# Patient Record
Sex: FEMALE | Race: WHITE | NOT HISPANIC OR LATINO | URBAN - METROPOLITAN AREA
[De-identification: names, ages, dates, MRNs, and addresses within clinical notes are randomized per-mention and may not be internally consistent; named-entity substitution may affect disease eponyms.]

---

## 2017-04-07 ENCOUNTER — APPOINTMENT (RX ONLY)
Dept: URBAN - METROPOLITAN AREA CLINIC 70 | Facility: CLINIC | Age: 52
Setting detail: DERMATOLOGY
End: 2017-04-07

## 2017-04-07 ENCOUNTER — APPOINTMENT (RX ONLY)
Dept: URBAN - METROPOLITAN AREA CLINIC 71 | Facility: CLINIC | Age: 52
Setting detail: DERMATOLOGY
End: 2017-04-07

## 2017-04-07 DIAGNOSIS — Z41.9 ENCOUNTER FOR PROCEDURE FOR PURPOSES OTHER THAN REMEDYING HEALTH STATE, UNSPECIFIED: ICD-10-CM

## 2017-04-07 PROBLEM — E78.5 HYPERLIPIDEMIA, UNSPECIFIED: Status: ACTIVE | Noted: 2017-04-07

## 2017-04-07 PROCEDURE — ? BOTOX (U OR CC)

## 2017-04-07 PROCEDURE — ? FILLERS (CC)

## 2017-04-07 ASSESSMENT — LOCATION SIMPLE DESCRIPTION DERM
LOCATION SIMPLE: RIGHT LIP
LOCATION SIMPLE: LEFT EYEBROW
LOCATION SIMPLE: RIGHT EYEBROW
LOCATION SIMPLE: RIGHT LIP
LOCATION SIMPLE: GLABELLA
LOCATION SIMPLE: CHIN
LOCATION SIMPLE: LEFT TEMPLE
LOCATION SIMPLE: LEFT LIP
LOCATION SIMPLE: SUPERIOR FOREHEAD
LOCATION SIMPLE: RIGHT CHEEK
LOCATION SIMPLE: LEFT CHEEK
LOCATION SIMPLE: RIGHT TEMPLE
LOCATION SIMPLE: LEFT CHEEK
LOCATION SIMPLE: SUPERIOR FOREHEAD
LOCATION SIMPLE: LEFT LIP
LOCATION SIMPLE: RIGHT EYEBROW
LOCATION SIMPLE: RIGHT TEMPLE
LOCATION SIMPLE: LEFT EYEBROW
LOCATION SIMPLE: LEFT TEMPLE
LOCATION SIMPLE: RIGHT CHEEK
LOCATION SIMPLE: CHIN
LOCATION SIMPLE: GLABELLA

## 2017-04-07 ASSESSMENT — LOCATION DETAILED DESCRIPTION DERM
LOCATION DETAILED: LEFT CENTRAL MALAR CHEEK
LOCATION DETAILED: LEFT INFERIOR TEMPLE
LOCATION DETAILED: RIGHT SUPERIOR CENTRAL MALAR CHEEK
LOCATION DETAILED: LEFT LOWER CUTANEOUS LIP
LOCATION DETAILED: LEFT LATERAL EYEBROW
LOCATION DETAILED: RIGHT CHIN
LOCATION DETAILED: LEFT CENTRAL MALAR CHEEK
LOCATION DETAILED: RIGHT INFERIOR TEMPLE
LOCATION DETAILED: RIGHT LATERAL EYEBROW
LOCATION DETAILED: RIGHT LATERAL MALAR CHEEK
LOCATION DETAILED: GLABELLA
LOCATION DETAILED: RIGHT LATERAL MALAR CHEEK
LOCATION DETAILED: RIGHT CHIN
LOCATION DETAILED: SUPERIOR MID FOREHEAD
LOCATION DETAILED: LEFT CENTRAL BUCCAL CHEEK
LOCATION DETAILED: RIGHT SUPERIOR CENTRAL MALAR CHEEK
LOCATION DETAILED: SUPERIOR MID FOREHEAD
LOCATION DETAILED: LEFT LATERAL EYEBROW
LOCATION DETAILED: GLABELLA
LOCATION DETAILED: LEFT CENTRAL BUCCAL CHEEK
LOCATION DETAILED: RIGHT CENTRAL BUCCAL CHEEK
LOCATION DETAILED: RIGHT INFERIOR TEMPLE
LOCATION DETAILED: RIGHT LATERAL EYEBROW
LOCATION DETAILED: LEFT INFERIOR TEMPLE
LOCATION DETAILED: LEFT LOWER CUTANEOUS LIP
LOCATION DETAILED: RIGHT UPPER CUTANEOUS LIP
LOCATION DETAILED: RIGHT UPPER CUTANEOUS LIP
LOCATION DETAILED: RIGHT CENTRAL BUCCAL CHEEK

## 2017-04-07 ASSESSMENT — LOCATION ZONE DERM
LOCATION ZONE: FACE
LOCATION ZONE: LIP
LOCATION ZONE: FACE
LOCATION ZONE: LIP

## 2017-04-07 NOTE — PROCEDURE: FILLERS (CC)
Additional Area 1 Volume In Cc: 1
Expiration Date (Month Year): 05/07/2018
Vermilion Lips Filler Volume In Cc: 0
Anesthesia Type: 0.3% lidocaine (mixed within filler)
Quantity Per Injection Site (Cc): 0.05 cc
Filler: Voluma
Lot #: [id number]
Additional Area 1 Location: Cheeks
Detail Level: Detailed
Additional Anesthesia Volume In Cc: 6
Consent: Written consent obtained. Risks include but not limited to bruising, beading, irregular texture, ulceration, infection, allergic reaction, scar formation, incomplete augmentation, temporary nature, procedural pain.
Post-Care Instructions: Patient instructed to apply ice to reduce swelling.
Anesthesia Volume In Cc: 0.5

## 2017-04-07 NOTE — PROCEDURE: FILLERS (CC)
Additional Anesthesia Volume In Cc: 6
Nasolabial Folds Filler Volume In Cc: 0
Anesthesia Volume In Cc: 0.5
Anesthesia Type: 0.3% lidocaine (mixed within filler)
Detail Level: Detailed
Consent: Written consent obtained. Risks include but not limited to bruising, beading, irregular texture, ulceration, infection, allergic reaction, scar formation, incomplete augmentation, temporary nature, procedural pain.
Filler: Voluma
Additional Area 1 Location: Cheeks
Post-Care Instructions: Patient instructed to apply ice to reduce swelling.
Additional Area 1 Volume In Cc: 1
Lot #: [id number]
Expiration Date (Month Year): 05/07/2018
Quantity Per Injection Site (Cc): 0.05 cc

## 2017-04-07 NOTE — PROCEDURE: BOTOX (U OR CC)
Additional Area 6 Units: 0
Quantity Per Injection Site (Units Or Cc): 12 U
Quantity Per Injection Site (Units Or Cc): 4 U
Detail Level: Detailed
Quantity Per Injection Site (Units Or Cc): 1 U
Quantity Per Injection Site (Units Or Cc): 11 U
Quantity Per Injection Site (Units Or Cc): 2 U
Forehead Units/Cc: 12
Document As Units Or Cc?: units
Additional Area 4 Units: 4
Additional Area 3 Units: 8
Consent: Written consent obtained. Risks include but not limited to lid/brow ptosis, bruising, swelling, diplopia, temporary effect, incomplete chemical denervation.
Quantity Per Injection Site (Units Or Cc): 20 U
Additional Area 1 Location: [facility]
Dilution (U/0.1 Cc): 1
Lot #: [id number]
Quantity Per Injection Site (Units Or Cc): 5 U
Glabellar Complex Units: 21
Expiration Date (Month Year): 10/2018
Additional Area 2 Location: Chin
Periorbital Skin Units/Cc: 24
Additional Area 4 Location: Tail End
Additional Area 2 Units: 5
Post-Care Instructions: Patient instructed to not lie down for 4 hours and limit physical activity for 24 hours.
Additional Area 3 Location: Upper and lower mouth

## 2017-04-07 NOTE — PROCEDURE: BOTOX (U OR CC)
Additional Area 6 Units: 0
Glabellar Complex Units: 21
Additional Area 3 Units: 8
Additional Area 3 Location: Upper and lower mouth
Consent: Written consent obtained. Risks include but not limited to lid/brow ptosis, bruising, swelling, diplopia, temporary effect, incomplete chemical denervation.
Lot #: [id number]
Periorbital Skin Units/Cc: 24
Document As Units Or Cc?: units
Expiration Date (Month Year): 10/2018
Additional Area 4 Location: Tail End
Additional Area 1 Location: [facility]
Additional Area 4 Units: 4
Additional Area 2 Units: 5
Detail Level: Detailed
Dilution (U/0.1 Cc): 1
Additional Area 2 Location: Chin
Forehead Units/Cc: 12
Post-Care Instructions: Patient instructed to not lie down for 4 hours and limit physical activity for 24 hours.
Quantity Per Injection Site (Units Or Cc): 20 U
Quantity Per Injection Site (Units Or Cc): 1 U
Quantity Per Injection Site (Units Or Cc): 12 U
Quantity Per Injection Site (Units Or Cc): 11 U
Quantity Per Injection Site (Units Or Cc): 4 U
Quantity Per Injection Site (Units Or Cc): 5 U
Quantity Per Injection Site (Units Or Cc): 2 U

## 2017-05-09 ENCOUNTER — APPOINTMENT (RX ONLY)
Dept: URBAN - METROPOLITAN AREA CLINIC 71 | Facility: CLINIC | Age: 52
Setting detail: DERMATOLOGY
End: 2017-05-09

## 2017-05-09 ENCOUNTER — APPOINTMENT (RX ONLY)
Dept: URBAN - METROPOLITAN AREA CLINIC 70 | Facility: CLINIC | Age: 52
Setting detail: DERMATOLOGY
End: 2017-05-09

## 2019-08-06 ENCOUNTER — HOSPITAL ENCOUNTER (OUTPATIENT)
Dept: HOSPITAL 61 - PCVCIMAG | Age: 54
Discharge: HOME | End: 2019-08-06
Attending: INTERNAL MEDICINE
Payer: COMMERCIAL

## 2019-08-06 DIAGNOSIS — R61: ICD-10-CM

## 2019-08-06 DIAGNOSIS — R00.2: Primary | ICD-10-CM

## 2019-08-06 PROCEDURE — 78452 HT MUSCLE IMAGE SPECT MULT: CPT

## 2019-08-06 PROCEDURE — A9500 TC99M SESTAMIBI: HCPCS

## 2019-08-06 PROCEDURE — 93017 CV STRESS TEST TRACING ONLY: CPT

## 2019-08-06 PROCEDURE — 93306 TTE W/DOPPLER COMPLETE: CPT

## 2019-08-06 NOTE — PCVCIMAG
--------------- APPROVED REPORT --------------





Imaging Protocol: Rest Tc-99m/Stress Tc-99m 1 day

Study performed:  08/06/2019 10:46:06



Indication: Palpitations, Genralized Diaphoresis

Patient Location: Out-Patient

Stress Nurse: Vivian Patel RN, Jenni Collins RN

NM Tech:Marlo Perez NMB



Ht: 5 ft 4 in Wt: 153 lbs BSA:  1.75 m2

HR: 82 bpm                      BP: 152/87 mmHg         BMI:  

26.2

Rhythm:  Sinus Rhythm



Medical History

Medical History: Age, Hyperlipidemia

Medications: Simvastatin

Allergies: No known drug allergies

Exercise History: Physically active



Resting Data

Rest SPECT myocardial perfusion imaging was performed in supine 

position 45 minutes following the intravenous injection of 10.7 mCi 

of Tc-99m Sestamibi.

Time of rest injection: 1020     Date: 08/06/2019

Administration Route: IV

Administration Site: Left AC



Exercise Stress

At peak stress, the patient was injected intravenously with 33.6mCi 

of Tc-99m Sestamibi.

Time of stress injection: 1130     Date: 08/06/2019

Administration Route: IV

Administration Site: Left AC

Patient continued to exercise for 9 minute(s).

Gated Stress SPECT was performed 45 minutes after stress 

injection.

The images were gated to evaluate regional wall motion and calculate 

left ventricular ejection fraction. 



Stress Test Details

Stress Test:  Exercise stress testing was performed using a Jaren 

protocol.



HRMax Heart Rate (APMHR): 164 bpm 

Resting HR:            83 bpmTarget HR (85% APMHR): 139 bpm

Max HR Achieved:  157 bpm

% of APMHR:         95

Recovery HR:            90 bpm

HR response to stress: Normal HR response to stress



BP

Resting BP:  152/87 mmHg

Max BP:       173/79 mmHg

Recovery BP:       146/69 mmHg

BP response to stress: Normal blood pressure response to 

stress.

ECG

Resting ECG:  Sinus Rhythm

Stress ECG:     Sinus Tachycardia

ST Change: None

Maximum ST Deviation: 0.5 mm

Arrhythmia:    PVC's

Recovery ECG: Sinus Rhythm

Recovery ST Change: None



Clinical

Reason for Termination: Maximal effort

Stress Symptoms: Dyspnea

Exercise duration: 10 min 11 sec

Exercise capacity: 13.40 METs

Overall Exercise Capacity for Age: Normal

Scale: Active

Angina Score: None

Symptoms resolved during recovery.



Stress ECG Conclusion

1. subjectively negative for ischemia

2. electrocardiographically negative for ischemia

3. adequate functional capacity

Duke Treadmill Score is 7.5 which is Low risk.



Study Data

Post stress, the left ventricular ejection was 85%..

SSS: 0

SRS: 0

SDS: 0

TID = 0.52.



Perfusion

Normal perfusion on both the stress and rest images.



Wall Motion

Normal left ventricular wall motion.



Nuclear Conclusion

ECG Findings: negative for ischemia 

Clinical Findings: negative for ischemia 

Nuclear Findings: negative for ischemia 

Exercise Capacity: normal

Left Ventricular Function: normal 

1. low risk study

2. post stress lvef 85% without wall motion abnormalities 



Interpreted by:  Keisha Stephens MD

Electronically Approved: 08/06/2019 12:55:07



<Conclusion>

1. subjectively negative for ischemia

2. electrocardiographically negative for ischemia

3. adequate functional capacity

## 2019-08-06 NOTE — PCVCIMAG
--------------- APPROVED REPORT --------------





Study performed:  2019 09:29:01



EXAM: Comprehensive 2D, Doppler, and color-flow 

Echocardiogram

Patient Location: Echo lab

Status:  routine



BSA:         1.75

HR: 65 bpmBP:          132/80 mmHg

Rhythm: NSR



Other Information 

Study Quality: Adequate



Risk Factors: 

Cardiac Risk Factors:  Hyperlipidemia



Indications

Palpitations



2D Dimensions

IVSd:  8.24 (7-11mm)LVOT Diam:  21.00 (18-24mm) 

LVDd:  35.72 mm

PWd:  8.43 (7-11mm)Ascending Ao:  28.36 (22-36mm)

LVDs:  24.97 (25-40mm)

Left Atrium:  31.30 (27-40mm)

Aortic Root:  28.16 mm

LV Single Plane 4CH:  58.93 %

LV Single Plane 2CH:  59.05 %

Biplane EF:  58.8 %



Volumes

Left Atrial Volume (Systole)

Single Plane 4CH:  26.88 mLSingle Plane 2CH:  39.61 mL

LA ESV Index:  19.00 mL/m2



Aortic Valve

AoV Peak Norman.:  0.97 m/s

AO Peak Gr.:  3.75 mmHgLVOT Max P.25 mmHg

LVOT Max V:  0.75 m/s

DEANNA Vmax: 2.77 cm2



Mitral Valve

E/A Ratio:  1.4

MV Decel. Time:  245.83 ms

MV E Max Norman.:  0.79 m/s

MV A Norman.:  0.56 m/s

IVRT:  76.12 ms



TDI

E/Lateral E':  7.90E/Medial E':  7.90

Medial E' Norman.:  0.10 m/s

Lateral E' Norman.:  0.10 m/s



Pulmonary Valve

PV Peak Norman.:  0.77 m/sPV Peak Gr.:  2.40 mmHg



Pulmonary Vein

P Vein S:    0.54 m/sP Vein A:  0.31 m/s

P Vein D:   0.34 m/sP Vein A Dur.:  79.6 msec

P Vein S/D Ratio:  1.59



Tricuspid Valve

RAP Estimate:  7.00 mmHg



Left Ventricle

The left ventricle is normal size. There is normal LV segmental wall 

motion. There is normal left ventricular wall thickness. Left 

ventricular systolic function is normal. The left ventricular 

ejection fraction is within the normal range. LVEF is 60%. The left 

ventricular diastolic function is normal.



Right Ventricle

The right ventricle is normal size. The right ventricular systolic 

function is normal.



Atria

The left atrium size is normal. The right atrium size is 

normal.



Aortic Valve

The aortic valve is normal in structure. No aortic regurgitation is 

present. There is no aortic valvular stenosis.



Mitral Valve

The mitral valve is normal in structure. Trace mitral regurgitation. 

No evidence of mitral valve stenosis.



Tricuspid Valve

The tricuspid valve is normal in structure. Trace tricuspid 

regurgitation. Unable to assess PA pressure.



Pulmonic Valve

The pulmonary valve is normal in structure. Trace pulmonic 

regurgitation.



Great Vessels

The aortic root is normal in size. IVC is normal in size and 

collapses >50% with inspiration.



Pericardium

There is no pericardial effusion.



<Conclusion>

The left ventricle is normal size.

LVEF is 60%.

The aortic valve is normal in structure.

The mitral valve is normal in structure.

Trace mitral regurgitation.

The tricuspid valve is normal in structure.

Trace tricuspid regurgitation.

Unable to assess PA pressure.

The pulmonary valve is normal in structure.

Trace pulmonic regurgitation.

There is no pericardial effusion.